# Patient Record
Sex: MALE | Race: BLACK OR AFRICAN AMERICAN | Employment: FULL TIME | URBAN - METROPOLITAN AREA
[De-identification: names, ages, dates, MRNs, and addresses within clinical notes are randomized per-mention and may not be internally consistent; named-entity substitution may affect disease eponyms.]

---

## 2024-11-22 ENCOUNTER — APPOINTMENT (OUTPATIENT)
Dept: GENERAL RADIOLOGY | Facility: CLINIC | Age: 54
End: 2024-11-22
Payer: COMMERCIAL

## 2024-11-22 ENCOUNTER — HOSPITAL ENCOUNTER (EMERGENCY)
Facility: CLINIC | Age: 54
Discharge: HOME | End: 2024-11-22
Attending: STUDENT IN AN ORGANIZED HEALTH CARE EDUCATION/TRAINING PROGRAM
Payer: COMMERCIAL

## 2024-11-22 VITALS
WEIGHT: 190 LBS | SYSTOLIC BLOOD PRESSURE: 139 MMHG | DIASTOLIC BLOOD PRESSURE: 81 MMHG | HEART RATE: 67 BPM | OXYGEN SATURATION: 98 % | TEMPERATURE: 98.2 F | HEIGHT: 67 IN | RESPIRATION RATE: 17 BRPM | BODY MASS INDEX: 29.82 KG/M2

## 2024-11-22 DIAGNOSIS — S83.91XA SPRAIN OF RIGHT KNEE, UNSPECIFIED LIGAMENT, INITIAL ENCOUNTER: Primary | ICD-10-CM

## 2024-11-22 PROCEDURE — 6370000000 HC RX 637 (ALT 250 FOR IP): Performed by: PHYSICIAN ASSISTANT

## 2024-11-22 PROCEDURE — 73562 X-RAY EXAM OF KNEE 3: CPT

## 2024-11-22 PROCEDURE — 99283 EMERGENCY DEPT VISIT LOW MDM: CPT

## 2024-11-22 RX ORDER — IBUPROFEN 800 MG/1
800 TABLET, FILM COATED ORAL ONCE
Status: COMPLETED | OUTPATIENT
Start: 2024-11-22 | End: 2024-11-22

## 2024-11-22 RX ORDER — IBUPROFEN 800 MG/1
800 TABLET, FILM COATED ORAL EVERY 8 HOURS PRN
Qty: 30 TABLET | Refills: 0 | Status: SHIPPED | OUTPATIENT
Start: 2024-11-22

## 2024-11-22 RX ADMIN — IBUPROFEN 800 MG: 800 TABLET ORAL at 16:07

## 2024-11-22 ASSESSMENT — PAIN SCALES - WONG BAKER: WONGBAKER_NUMERICALRESPONSE: HURTS A LITTLE BIT

## 2024-11-22 ASSESSMENT — PAIN - FUNCTIONAL ASSESSMENT: PAIN_FUNCTIONAL_ASSESSMENT: WONG-BAKER FACES

## 2024-11-22 NOTE — ED PROVIDER NOTES
EMERGENCY DEPARTMENT ENCOUNTER    Pt Name: Gerald Marin  MRN: [id number]  Birthdate 1970  Date of evaluation: 11/22/24  CHIEF COMPLAINT       Chief Complaint   Patient presents with    Knee Pain     Workmen's comp. Right knee pain     HISTORY OF PRESENT ILLNESS   Patient is a 54-year-old male who presents with right knee pain.  Just prior to arrival he was at work, using a floor stripper when his right leg slipped, twisting and getting caught underneath a cabinet.  He did fall and was unable to get up on his own.  He denies a history of previous injury.  He did take 1 acetaminophen prior to arrival.  No previous history of surgery.  He denies any numbness or tingling.  No other injuries.             REVIEW OF SYSTEMS     Review of Systems   Musculoskeletal:  Positive for arthralgias and myalgias.     PASTMEDICAL HISTORY   No past medical history on file.  Past Problem List  There is no problem list on file for this patient.    SURGICAL HISTORY     No past surgical history on file.  CURRENT MEDICATIONS       Current Discharge Medication List        ALLERGIES     has No Known Allergies.  FAMILY HISTORY     has no family status information on file.      SOCIAL HISTORY        PHYSICAL EXAM     INITIAL VITALS: /81   Pulse 67   Temp 98.2 °F (36.8 °C)   Resp 17   Ht 1.702 m (5' 7\")   Wt 86.2 kg (190 lb)   SpO2 98%   BMI 29.76 kg/m²    Physical Exam  Constitutional:       Appearance: He is well-developed. He is not diaphoretic.   HENT:      Head: Normocephalic and atraumatic.      Right Ear: External ear normal.      Left Ear: External ear normal.   Eyes:      General: No scleral icterus.        Right eye: No discharge.         Left eye: No discharge.   Neck:      Trachea: No tracheal deviation.   Musculoskeletal:      Left knee: Bony tenderness and crepitus present. Decreased range of motion. Tenderness present over the medial joint line and lateral joint line.      Comments: Patellar tendon is intact to

## 2024-11-22 NOTE — ED PROVIDER NOTES
[facility]      Pt Name: Gerald Marin  MRN: [id number]  Birthdate 1970  Date of evaluation: 11/22/2024    EMERGENCY DEPARTMENT ENCOUNTER           I reviewed the mid level provider's note and agree with the documented findings and we have discussed the plan of care. I have reviewed the emergency nurses triage note. I agree with the chief complaint, past medical history, past surgical history, allergies, medications, social and family history as documented unless otherwise noted below.      Electronically signed by Michael Gao MD on 11/22/2024 at 4:52 PM       Michael Gao MD  11/22/24 [id number]

## 2024-11-22 NOTE — DISCHARGE INSTRUCTIONS
Please use the crutches for comfort.  Elevate your knee while seated.  Use ice to the area for 10 to 15 minutes every 3-4 hours.    You may take Tylenol and Motrin according to the directions and the bottle as needed for pain    It is important that you have follow-up with occupational health and with orthopedics for this injury    Return to the emergency department for worsening pain numbness tingling or other emergent concerns

## 2024-11-26 ENCOUNTER — TELEPHONE (OUTPATIENT)
Dept: ORTHOPEDIC SURGERY | Age: 54
End: 2024-11-26

## 2024-11-26 NOTE — TELEPHONE ENCOUNTER
Voicemail box full, unable to leave message for patient, responding to referral from [facility]. Patient needs to follow up with employer and Occupational Health and get C-9 approval to see Dr Barrios or JOSE LUIS Guadarrama.